# Patient Record
Sex: FEMALE | Race: WHITE | NOT HISPANIC OR LATINO | Employment: UNEMPLOYED | URBAN - METROPOLITAN AREA
[De-identification: names, ages, dates, MRNs, and addresses within clinical notes are randomized per-mention and may not be internally consistent; named-entity substitution may affect disease eponyms.]

---

## 2023-11-22 ENCOUNTER — TELEPHONE (OUTPATIENT)
Dept: PRIMARY CARE | Facility: CLINIC | Age: 58
End: 2023-11-22
Payer: COMMERCIAL

## 2023-11-22 DIAGNOSIS — U07.1 COVID: Primary | ICD-10-CM

## 2023-11-22 DIAGNOSIS — M06.9 RHEUMATOID ARTHRITIS, INVOLVING UNSPECIFIED SITE, UNSPECIFIED WHETHER RHEUMATOID FACTOR PRESENT (MULTI): ICD-10-CM

## 2023-11-22 RX ORDER — METHOTREXATE 2.5 MG/1
TABLET ORAL
Qty: 12 TABLET | Refills: 0 | Status: SHIPPED | OUTPATIENT
Start: 2023-11-22 | End: 2023-11-22 | Stop reason: WASHOUT

## 2023-11-22 RX ORDER — NIRMATRELVIR AND RITONAVIR 300-100 MG
3 KIT ORAL 2 TIMES DAILY
Qty: 30 TABLET | Refills: 0 | Status: SHIPPED | OUTPATIENT
Start: 2023-11-22 | End: 2023-11-27

## 2023-11-22 RX ORDER — METHYLPREDNISOLONE 4 MG/1
TABLET ORAL
Qty: 21 TABLET | Refills: 0 | Status: SHIPPED | OUTPATIENT
Start: 2023-11-22 | End: 2023-11-29

## 2023-11-22 RX ORDER — METHOTREXATE 2.5 MG/1
TABLET ORAL
Qty: 12 TABLET | Refills: 0
Start: 2023-11-22

## 2023-11-23 NOTE — TELEPHONE ENCOUNTER
Paged. Symptoms started yesterday. Headache, congestion, cough. Tried nyquil  Body aches.  +covid today.

## 2023-12-31 PROBLEM — M47.812 DEGENERATIVE JOINT DISEASE OF CERVICAL SPINE: Status: ACTIVE | Noted: 2023-12-31

## 2023-12-31 PROBLEM — K21.9 GERD (GASTROESOPHAGEAL REFLUX DISEASE): Status: ACTIVE | Noted: 2023-12-31

## 2023-12-31 PROBLEM — K86.2 PANCREATIC CYST (HHS-HCC): Status: ACTIVE | Noted: 2023-12-31

## 2023-12-31 PROBLEM — F17.200 CURRENT EVERY DAY SMOKER: Status: ACTIVE | Noted: 2023-12-31

## 2023-12-31 PROBLEM — K59.09 CHRONIC CONSTIPATION: Status: ACTIVE | Noted: 2023-12-31

## 2023-12-31 PROBLEM — K86.1 CHRONIC PANCREATITIS (MULTI): Status: ACTIVE | Noted: 2023-12-31

## 2023-12-31 PROBLEM — K63.5 COLON POLYPS: Status: ACTIVE | Noted: 2023-12-31

## 2023-12-31 PROBLEM — E78.5 HYPERLIPIDEMIA: Status: ACTIVE | Noted: 2023-12-31

## 2023-12-31 PROBLEM — R51.9 CHRONIC HEADACHES: Status: ACTIVE | Noted: 2023-12-31

## 2023-12-31 PROBLEM — G89.29 CHRONIC HEADACHES: Status: ACTIVE | Noted: 2023-12-31

## 2023-12-31 PROBLEM — K22.70 SHORT-SEGMENT BARRETT'S ESOPHAGUS: Status: ACTIVE | Noted: 2023-12-31

## 2023-12-31 PROBLEM — R41.9 COGNITIVE COMPLAINTS: Status: ACTIVE | Noted: 2023-12-31

## 2023-12-31 PROBLEM — I10 HYPERTENSION: Status: ACTIVE | Noted: 2023-12-31

## 2023-12-31 PROBLEM — J44.9 CHRONIC OBSTRUCTIVE PULMONARY DISEASE (MULTI): Status: ACTIVE | Noted: 2023-12-31

## 2023-12-31 PROBLEM — M06.9 RHEUMATOID ARTHRITIS (MULTI): Status: ACTIVE | Noted: 2023-12-31

## 2023-12-31 NOTE — PROGRESS NOTES
Chief Complaint   Patient presents with    Hospital Follow-up     Pt presents for virtual appt for [facility] after [facility] ER on 12/27/23.            58 year old woman  ER SW 12/27/2023.  Slightly better still coughing green, yellow, brown. Very thick.   Wheezing at night. Choking. Using 6 pillows. No fever.  Completed zpack and medrol.   Pharmacist did not dispense cough syrup with codeine because on narcotic.   Takes niacin when she quits smoking. Seems to be the only thing that helps.        PAST MEDICAL HISTORY:   1. Hypertension.  2. Syncope, catheterization in 2014.  3. Depression, anxiety, bipolar disorder, February 2018.  4. Hernia.  5. History of elective bilateral mastectomies and oophorectomy  secondary to testing that showed high risk of breast cancer. BRCA2  positive and BARD1 positive.  6. Colon polyp, colonoscopy in 2017, Dr. Francis  2019 Dr Marquez  7. Esophageal stricture, status post dilatation.  8. Urinary incontinence, status post mesh implant.  9. Renal calculi, status post surgery.  10.RA Dr Martinez.  11. History of Arreguin's esophagus.  12. Sleep apnea.  13. Smoker.  14. Pancreatitis admission 2018 [facility]. (also 2 prior episodes)  2nd opinion DR Michelle FORREST  15. Obstructive lung disease, per PFTs were consistent with asthma. PFTs done in May 2018     SOCIAL HISTORY: Smoker She has 5 children. Her  boyfriend passed away 2018.      Physical exam  Able to speak in full sentences.     Reviewed 12/2023 labs slightly elevated wbc.  Pt reports bp 153/108 hr 57 sat 95% pulse 72 on pulse oximeter.      Labs 5/2023 cbc, cmp, lipid   wbc 6.4 hg 13.4 hct 41. 2 platelet 286  Cr 0.85 glucose low 67 lfts negative   cholesterol 216/45/134/183     September 2022 CBC glucose LFTs TSH immunoglobulin level, CMP  White blood cell count 6.5 hemoglobin 13.6 hematocrit 42.4 platelet 287  Glucose 61 liver function test negative creatinine 0.8  TSH T4 normal  IgA, M, G levels normal     September 28, 2022 CT chest  without contrast no PE. Changes consistent with emphysema per radiology report     4/2022 Rheumatoid factor, cmp, troponin, cbc  rheumatoid factor positive  cr 0.7 glucose 98 Na 140 K 4.1 troponin negative  wbc 5.5 hg 14.1 platelet 272     4/2022 Modified barium swallow Dr Acuña     5/2022 MRI abdomen with and without contrast  multiple sub 5 mm cysts scattered throughout pancreas may represent retention cysts vs multifocal side branch type intraductal papillary mucinous neoplasm. overall no suspicious focal pancreatic abnormality.   Dr Abdelrahman Acuña.      Assessment and plan   Pneumonia  COPD exacerbation  Change to doxycycline 100 mg bid x 10 days  Medrol. Did not do prednisone as she indicates nausea with steroids  Continue nebulizers. Refilled medication.  Do not take niacin while on doxy  Would have to stop atorvastatin when she does take niacin (she indicates takes it for 10 days  Return to care if symptoms refractory or worsening. Patient verbalized understanding and agreement with plan.           Bilateral mastectomies  Dexa at age 66  7/2019 colonoscopy [facility]. per report recommended repeat in 6 months because of suboptimal prep  12/2020 colonoscopy - when open report indicates scope 2019 poor prep.

## 2024-01-02 ENCOUNTER — TELEMEDICINE (OUTPATIENT)
Dept: PRIMARY CARE | Facility: CLINIC | Age: 59
End: 2024-01-02
Payer: COMMERCIAL

## 2024-01-02 VITALS — DIASTOLIC BLOOD PRESSURE: 57 MMHG | HEART RATE: 108 BPM | SYSTOLIC BLOOD PRESSURE: 153 MMHG

## 2024-01-02 DIAGNOSIS — J44.9 CHRONIC OBSTRUCTIVE PULMONARY DISEASE, UNSPECIFIED COPD TYPE (MULTI): Primary | ICD-10-CM

## 2024-01-02 PROCEDURE — 99214 OFFICE O/P EST MOD 30 MIN: CPT | Performed by: INTERNAL MEDICINE

## 2024-01-02 RX ORDER — MELATONIN 10 MG
1 TABLET, SUBLINGUAL SUBLINGUAL NIGHTLY
COMMUNITY
Start: 2023-04-19

## 2024-01-02 RX ORDER — AMLODIPINE BESYLATE 2.5 MG/1
2.5 TABLET ORAL DAILY
COMMUNITY
Start: 2017-04-23

## 2024-01-02 RX ORDER — ALBUTEROL SULFATE 0.83 MG/ML
2.5 SOLUTION RESPIRATORY (INHALATION) EVERY 4 HOURS PRN
COMMUNITY
Start: 2023-06-09 | End: 2024-01-02 | Stop reason: SDUPTHER

## 2024-01-02 RX ORDER — METHYLPREDNISOLONE 4 MG/1
TABLET ORAL
Qty: 21 TABLET | Refills: 0 | Status: SHIPPED | OUTPATIENT
Start: 2024-01-02

## 2024-01-02 RX ORDER — BENZTROPINE MESYLATE 1 MG/1
1 TABLET ORAL 2 TIMES DAILY
COMMUNITY
Start: 2019-06-06

## 2024-01-02 RX ORDER — ALBUTEROL SULFATE 0.83 MG/ML
2.5 SOLUTION RESPIRATORY (INHALATION) EVERY 4 HOURS PRN
Qty: 75 ML | Refills: 3 | Status: SHIPPED | OUTPATIENT
Start: 2024-01-02

## 2024-01-02 RX ORDER — PANTOPRAZOLE SODIUM 40 MG/1
40 TABLET, DELAYED RELEASE ORAL
COMMUNITY
Start: 2015-08-25 | End: 2024-02-04

## 2024-01-02 RX ORDER — BUDESONIDE AND FORMOTEROL FUMARATE DIHYDRATE 80; 4.5 UG/1; UG/1
2 AEROSOL RESPIRATORY (INHALATION)
Qty: 1 EACH | Refills: 11 | Status: SHIPPED | OUTPATIENT
Start: 2024-01-02 | End: 2024-01-16

## 2024-01-02 RX ORDER — BUDESONIDE AND FORMOTEROL FUMARATE DIHYDRATE 80; 4.5 UG/1; UG/1
2 AEROSOL RESPIRATORY (INHALATION)
COMMUNITY
Start: 2018-02-08 | End: 2024-01-02 | Stop reason: SDUPTHER

## 2024-01-02 RX ORDER — ACETAMINOPHEN 325 MG/1
650 TABLET ORAL EVERY 4 HOURS PRN
COMMUNITY
Start: 2022-02-03

## 2024-01-02 RX ORDER — CLONAZEPAM 1 MG/1
1 TABLET ORAL AS NEEDED
COMMUNITY
Start: 2022-07-05

## 2024-01-02 RX ORDER — AMOXICILLIN 250 MG
2 CAPSULE ORAL 2 TIMES DAILY
COMMUNITY
Start: 2022-02-28 | End: 2024-02-04

## 2024-01-02 RX ORDER — ALBUTEROL SULFATE 90 UG/1
2 AEROSOL, METERED RESPIRATORY (INHALATION) EVERY 4 HOURS PRN
COMMUNITY
Start: 2018-02-08

## 2024-01-02 RX ORDER — IPRATROPIUM BROMIDE AND ALBUTEROL 20; 100 UG/1; UG/1
1 SPRAY, METERED RESPIRATORY (INHALATION)
COMMUNITY
Start: 2022-01-28

## 2024-01-02 RX ORDER — DEXAMETHASONE 4 MG/1
1 TABLET ORAL
Qty: 12 G | Refills: 11 | Status: SHIPPED | OUTPATIENT
Start: 2024-01-02 | End: 2024-01-04

## 2024-01-02 RX ORDER — CARIPRAZINE 1.5 MG/1
1 CAPSULE, GELATIN COATED ORAL DAILY
COMMUNITY
Start: 2020-08-28

## 2024-01-02 RX ORDER — FOLIC ACID 1 MG/1
1 TABLET ORAL DAILY
COMMUNITY
Start: 2022-05-23

## 2024-01-02 RX ORDER — DEXAMETHASONE 4 MG/1
1 TABLET ORAL 2 TIMES DAILY PRN
COMMUNITY
End: 2024-01-02 | Stop reason: SDUPTHER

## 2024-01-02 RX ORDER — PRAZOSIN HYDROCHLORIDE 2 MG/1
2 CAPSULE ORAL NIGHTLY
COMMUNITY

## 2024-01-02 RX ORDER — ZOLPIDEM TARTRATE 5 MG/1
5 TABLET ORAL NIGHTLY PRN
COMMUNITY
Start: 2023-12-15

## 2024-01-02 RX ORDER — DOXYCYCLINE 100 MG/1
100 CAPSULE ORAL 2 TIMES DAILY
Qty: 14 CAPSULE | Refills: 0 | Status: SHIPPED | OUTPATIENT
Start: 2024-01-02 | End: 2024-01-12

## 2024-01-02 RX ORDER — ATORVASTATIN CALCIUM 20 MG/1
20 TABLET, FILM COATED ORAL NIGHTLY
COMMUNITY
Start: 2022-01-21 | End: 2024-01-29 | Stop reason: SDUPTHER

## 2024-01-02 RX ORDER — OXYCODONE AND ACETAMINOPHEN 7.5; 325 MG/1; MG/1
TABLET ORAL
COMMUNITY
Start: 2022-01-28

## 2024-01-02 RX ORDER — LAMOTRIGINE 100 MG/1
1 TABLET, EXTENDED RELEASE ORAL 2 TIMES DAILY
COMMUNITY

## 2024-01-02 RX ORDER — GABAPENTIN 800 MG/1
800 TABLET ORAL 3 TIMES DAILY
COMMUNITY
Start: 2020-08-28

## 2024-01-02 RX ORDER — AZITHROMYCIN 250 MG/1
250 TABLET, FILM COATED ORAL DAILY
COMMUNITY
Start: 2023-12-28 | End: 2024-01-02 | Stop reason: WASHOUT

## 2024-01-02 RX ORDER — METHYLPREDNISOLONE 4 MG/1
4 TABLET ORAL ONCE
COMMUNITY
End: 2024-01-02 | Stop reason: SDUPTHER

## 2024-01-02 ASSESSMENT — PATIENT HEALTH QUESTIONNAIRE - PHQ9
2. FEELING DOWN, DEPRESSED OR HOPELESS: NOT AT ALL
SUM OF ALL RESPONSES TO PHQ9 QUESTIONS 1 & 2: 0
1. LITTLE INTEREST OR PLEASURE IN DOING THINGS: NOT AT ALL

## 2024-01-02 ASSESSMENT — PAIN SCALES - GENERAL: PAINLEVEL: 0-NO PAIN

## 2024-01-04 RX ORDER — MOMETASONE FUROATE 110 UG/1
2 INHALANT RESPIRATORY (INHALATION)
Qty: 1 EACH | Refills: 11 | Status: SHIPPED | OUTPATIENT
Start: 2024-01-04 | End: 2024-01-04 | Stop reason: SDUPTHER

## 2024-01-04 RX ORDER — MOMETASONE FUROATE 110 UG/1
INHALANT RESPIRATORY (INHALATION)
Qty: 1 EACH | Refills: 11 | Status: SHIPPED | OUTPATIENT
Start: 2024-01-04

## 2024-01-05 ENCOUNTER — TELEPHONE (OUTPATIENT)
Dept: PRIMARY CARE | Facility: CLINIC | Age: 59
End: 2024-01-05
Payer: COMMERCIAL

## 2024-01-05 DIAGNOSIS — J44.9 CHRONIC OBSTRUCTIVE PULMONARY DISEASE, UNSPECIFIED COPD TYPE (MULTI): ICD-10-CM

## 2024-01-05 NOTE — TELEPHONE ENCOUNTER
----- Message from Brianne Reeves MD sent at 1/4/2024 12:56 PM EST -----  Flovent brand name discontinued by pharmacy.  Please let pt know sent in rx for asmanex to replace.

## 2024-01-08 RX ORDER — DOXYCYCLINE 100 MG/1
100 CAPSULE ORAL 2 TIMES DAILY
Qty: 14 CAPSULE | Refills: 0 | Status: CANCELLED | OUTPATIENT
Start: 2024-01-08 | End: 2024-01-18

## 2024-01-16 DIAGNOSIS — J44.9 CHRONIC OBSTRUCTIVE PULMONARY DISEASE, UNSPECIFIED COPD TYPE (MULTI): ICD-10-CM

## 2024-01-16 DIAGNOSIS — J45.909 ASTHMA, UNSPECIFIED ASTHMA SEVERITY, UNSPECIFIED WHETHER COMPLICATED, UNSPECIFIED WHETHER PERSISTENT (HHS-HCC): Primary | ICD-10-CM

## 2024-01-16 RX ORDER — BUDESONIDE AND FORMOTEROL FUMARATE DIHYDRATE 80; 4.5 UG/1; UG/1
2 AEROSOL RESPIRATORY (INHALATION)
Qty: 1 EACH | Refills: 11 | Status: SHIPPED | OUTPATIENT
Start: 2024-01-16 | End: 2025-01-15

## 2024-01-29 DIAGNOSIS — E78.5 HYPERLIPIDEMIA, UNSPECIFIED HYPERLIPIDEMIA TYPE: ICD-10-CM

## 2024-01-29 RX ORDER — ATORVASTATIN CALCIUM 20 MG/1
20 TABLET, FILM COATED ORAL NIGHTLY
Qty: 90 TABLET | Refills: 1 | Status: SHIPPED | OUTPATIENT
Start: 2024-01-29

## 2024-02-04 DIAGNOSIS — R13.10 DYSPHAGIA: ICD-10-CM

## 2024-02-04 DIAGNOSIS — K59.09 OTHER CONSTIPATION: ICD-10-CM

## 2024-02-04 RX ORDER — PANTOPRAZOLE SODIUM 40 MG/1
TABLET, DELAYED RELEASE ORAL
Qty: 60 TABLET | Refills: 3 | Status: SHIPPED | OUTPATIENT
Start: 2024-02-04

## 2024-02-04 RX ORDER — DOCUSATE SODIUM 50 MG AND SENNOSIDES 8.6 MG 8.6; 5 MG/1; MG/1
2 TABLET, FILM COATED ORAL 2 TIMES DAILY
Qty: 120 TABLET | Refills: 3 | Status: SHIPPED | OUTPATIENT
Start: 2024-02-04

## 2024-07-30 DIAGNOSIS — R13.10 DYSPHAGIA: ICD-10-CM

## 2024-07-30 DIAGNOSIS — K59.09 OTHER CONSTIPATION: ICD-10-CM

## 2024-07-30 RX ORDER — AMOXICILLIN 250 MG
2 CAPSULE ORAL 2 TIMES DAILY
Qty: 120 TABLET | Refills: 3 | Status: SHIPPED | OUTPATIENT
Start: 2024-07-30

## 2024-07-30 RX ORDER — PANTOPRAZOLE SODIUM 40 MG/1
40 TABLET, DELAYED RELEASE ORAL
Qty: 60 TABLET | Refills: 3 | Status: SHIPPED | OUTPATIENT
Start: 2024-07-30

## 2024-08-27 DIAGNOSIS — E78.5 HYPERLIPIDEMIA, UNSPECIFIED HYPERLIPIDEMIA TYPE: ICD-10-CM

## 2024-08-27 RX ORDER — ATORVASTATIN CALCIUM 20 MG/1
20 TABLET, FILM COATED ORAL NIGHTLY
Qty: 90 TABLET | Refills: 1 | Status: SHIPPED | OUTPATIENT
Start: 2024-08-27

## 2024-09-16 DIAGNOSIS — I10 HYPERTENSION, UNSPECIFIED TYPE: ICD-10-CM

## 2024-09-16 RX ORDER — AMLODIPINE BESYLATE 2.5 MG/1
2.5 TABLET ORAL DAILY
Qty: 90 TABLET | Refills: 0 | Status: SHIPPED | OUTPATIENT
Start: 2024-09-16

## 2024-10-10 DIAGNOSIS — Z12.31 ENCOUNTER FOR SCREENING MAMMOGRAM FOR BREAST CANCER: ICD-10-CM

## 2024-11-27 DIAGNOSIS — K59.09 OTHER CONSTIPATION: ICD-10-CM

## 2024-11-27 RX ORDER — DOCUSATE SODIUM 50 MG AND SENNOSIDES 8.6 MG 8.6; 5 MG/1; MG/1
2 TABLET, FILM COATED ORAL 2 TIMES DAILY
Qty: 120 TABLET | Refills: 3 | Status: SHIPPED | OUTPATIENT
Start: 2024-11-27

## 2024-12-17 DIAGNOSIS — I10 HYPERTENSION, UNSPECIFIED TYPE: ICD-10-CM

## 2024-12-17 RX ORDER — AMLODIPINE BESYLATE 2.5 MG/1
2.5 TABLET ORAL DAILY
Qty: 90 TABLET | Refills: 3 | Status: SHIPPED | OUTPATIENT
Start: 2024-12-17

## 2025-02-23 DIAGNOSIS — E78.5 HYPERLIPIDEMIA, UNSPECIFIED HYPERLIPIDEMIA TYPE: ICD-10-CM

## 2025-02-24 RX ORDER — ATORVASTATIN CALCIUM 20 MG/1
20 TABLET, FILM COATED ORAL NIGHTLY
Qty: 90 TABLET | Refills: 0 | Status: SHIPPED | OUTPATIENT
Start: 2025-02-24

## 2025-03-24 DIAGNOSIS — R13.10 DYSPHAGIA: ICD-10-CM

## 2025-03-24 DIAGNOSIS — K59.09 OTHER CONSTIPATION: ICD-10-CM

## 2025-03-24 RX ORDER — PANTOPRAZOLE SODIUM 40 MG/1
TABLET, DELAYED RELEASE ORAL
Qty: 60 TABLET | Refills: 3 | Status: SHIPPED | OUTPATIENT
Start: 2025-03-24

## 2025-03-24 RX ORDER — DOCUSATE SODIUM 50 MG AND SENNOSIDES 8.6 MG 8.6; 5 MG/1; MG/1
2 TABLET, FILM COATED ORAL 2 TIMES DAILY
Qty: 120 TABLET | Refills: 3 | Status: SHIPPED | OUTPATIENT
Start: 2025-03-24

## 2025-05-28 DIAGNOSIS — E78.5 HYPERLIPIDEMIA, UNSPECIFIED HYPERLIPIDEMIA TYPE: ICD-10-CM

## 2025-05-28 RX ORDER — ATORVASTATIN CALCIUM 20 MG/1
20 TABLET, FILM COATED ORAL NIGHTLY
Qty: 30 TABLET | Refills: 0 | Status: SHIPPED | OUTPATIENT
Start: 2025-05-28

## 2025-06-30 DIAGNOSIS — E78.5 HYPERLIPIDEMIA, UNSPECIFIED HYPERLIPIDEMIA TYPE: ICD-10-CM

## 2025-06-30 RX ORDER — ATORVASTATIN CALCIUM 20 MG/1
20 TABLET, FILM COATED ORAL NIGHTLY
Qty: 30 TABLET | Refills: 0 | OUTPATIENT
Start: 2025-06-30

## 2025-07-09 DIAGNOSIS — E78.5 HYPERLIPIDEMIA, UNSPECIFIED HYPERLIPIDEMIA TYPE: ICD-10-CM

## 2025-07-09 RX ORDER — ATORVASTATIN CALCIUM 20 MG/1
20 TABLET, FILM COATED ORAL NIGHTLY
Qty: 30 TABLET | Refills: 0 | OUTPATIENT
Start: 2025-07-09

## 2025-07-29 DIAGNOSIS — K59.09 OTHER CONSTIPATION: ICD-10-CM

## 2025-07-29 RX ORDER — DOCUSATE SODIUM 50 MG AND SENNOSIDES 8.6 MG 8.6; 5 MG/1; MG/1
2 TABLET, FILM COATED ORAL 2 TIMES DAILY
Qty: 120 TABLET | Refills: 3 | Status: SHIPPED | OUTPATIENT
Start: 2025-07-29